# Patient Record
Sex: FEMALE | Race: WHITE | HISPANIC OR LATINO | URBAN - METROPOLITAN AREA
[De-identification: names, ages, dates, MRNs, and addresses within clinical notes are randomized per-mention and may not be internally consistent; named-entity substitution may affect disease eponyms.]

---

## 2021-05-18 ENCOUNTER — TELEHEALTH PROVIDED OTHER THAN IN PATIENT'S HOME (OUTPATIENT)
Dept: URBAN - METROPOLITAN AREA TELEHEALTH 3 | Facility: TELEHEALTH | Age: 51
End: 2021-05-18

## 2021-05-18 VITALS — HEIGHT: 65 IN | WEIGHT: 174 LBS

## 2021-05-18 DIAGNOSIS — R19.5 OTHER FECAL ABNORMALITIES: ICD-10-CM

## 2021-05-18 DIAGNOSIS — Z85.3 PERSONAL HISTORY OF MALIGNANT NEOPLASM OF BREAST: ICD-10-CM

## 2021-05-18 PROCEDURE — 99203 OFFICE O/P NEW LOW 30 MIN: CPT | Mod: 95 | Performed by: PHYSICIAN ASSISTANT

## 2021-05-18 NOTE — SERVICEHPINOTES
PATIENT VERIFIED BY DATE OF BIRTH AND NAME. Patient has been consented for this telecommunication visit.   MICHAEL RUBIN   is a   51   year old    female who is being seen in consultation at the request of   ZULLY MA   for recent positive Cologuard. Patient's son is present to help with translation. Patient has h/o breast cancer, s/p mastectomy in 2016. She had some radiation as well and is on tamoxifen. She reports normal daily BMs, no constipation, diarrhea, or blood in stools. She feels well overall and has no digestive complaints and denies any other significant medical history.

## 2021-05-27 ENCOUNTER — OFFICE (OUTPATIENT)
Dept: URBAN - METROPOLITAN AREA CLINIC 30 | Facility: CLINIC | Age: 51
End: 2021-05-27

## 2021-05-27 VITALS
WEIGHT: 174 LBS | HEART RATE: 82 BPM | RESPIRATION RATE: 17 BRPM | RESPIRATION RATE: 17 BRPM | SYSTOLIC BLOOD PRESSURE: 101 MMHG | DIASTOLIC BLOOD PRESSURE: 73 MMHG | TEMPERATURE: 98.1 F | OXYGEN SATURATION: 100 % | HEART RATE: 76 BPM | SYSTOLIC BLOOD PRESSURE: 95 MMHG | HEART RATE: 71 BPM | SYSTOLIC BLOOD PRESSURE: 106 MMHG | HEART RATE: 79 BPM | DIASTOLIC BLOOD PRESSURE: 57 MMHG | OXYGEN SATURATION: 100 % | SYSTOLIC BLOOD PRESSURE: 129 MMHG | HEART RATE: 82 BPM | OXYGEN SATURATION: 98 % | SYSTOLIC BLOOD PRESSURE: 107 MMHG | HEART RATE: 71 BPM | SYSTOLIC BLOOD PRESSURE: 115 MMHG | DIASTOLIC BLOOD PRESSURE: 55 MMHG | RESPIRATION RATE: 16 BRPM | DIASTOLIC BLOOD PRESSURE: 57 MMHG | HEART RATE: 76 BPM | RESPIRATION RATE: 18 BRPM | OXYGEN SATURATION: 99 % | WEIGHT: 174 LBS | RESPIRATION RATE: 16 BRPM | DIASTOLIC BLOOD PRESSURE: 64 MMHG | SYSTOLIC BLOOD PRESSURE: 111 MMHG | HEART RATE: 73 BPM | SYSTOLIC BLOOD PRESSURE: 106 MMHG | SYSTOLIC BLOOD PRESSURE: 95 MMHG | DIASTOLIC BLOOD PRESSURE: 73 MMHG | RESPIRATION RATE: 20 BRPM | SYSTOLIC BLOOD PRESSURE: 115 MMHG | SYSTOLIC BLOOD PRESSURE: 111 MMHG | DIASTOLIC BLOOD PRESSURE: 66 MMHG | OXYGEN SATURATION: 98 % | HEART RATE: 79 BPM | DIASTOLIC BLOOD PRESSURE: 63 MMHG | RESPIRATION RATE: 20 BRPM | HEIGHT: 65 IN | HEART RATE: 73 BPM | TEMPERATURE: 97.5 F | SYSTOLIC BLOOD PRESSURE: 107 MMHG | DIASTOLIC BLOOD PRESSURE: 55 MMHG | SYSTOLIC BLOOD PRESSURE: 101 MMHG | TEMPERATURE: 97.5 F | HEIGHT: 65 IN | DIASTOLIC BLOOD PRESSURE: 64 MMHG | HEART RATE: 74 BPM | TEMPERATURE: 98.1 F | DIASTOLIC BLOOD PRESSURE: 63 MMHG | RESPIRATION RATE: 18 BRPM | HEART RATE: 74 BPM | SYSTOLIC BLOOD PRESSURE: 129 MMHG | DIASTOLIC BLOOD PRESSURE: 66 MMHG | OXYGEN SATURATION: 99 %

## 2021-05-27 DIAGNOSIS — R19.5 OTHER FECAL ABNORMALITIES: ICD-10-CM

## 2021-05-27 DIAGNOSIS — K63.5 POLYP OF COLON: ICD-10-CM

## 2021-05-27 DIAGNOSIS — D12.5 BENIGN NEOPLASM OF SIGMOID COLON: ICD-10-CM

## 2021-05-27 LAB
GI LOWER POLYPECTOMY EXCISION - SPECM 1: CLINICAL INFORMATION: (no result)
GI LOWER POLYPECTOMY EXCISION - SPECM 1: CLINICAL INFORMATION: (no result)
GI LOWER POLYPECTOMY EXCISION - SPECM 1: CLINICAL OBSERVATIONS: (no result)
GI LOWER POLYPECTOMY EXCISION - SPECM 1: CLINICAL OBSERVATIONS: (no result)
GI LOWER POLYPECTOMY EXCISION - SPECM 1: CPT CODES: (no result)
GI LOWER POLYPECTOMY EXCISION - SPECM 1: CPT CODES: (no result)
GI LOWER POLYPECTOMY EXCISION - SPECM 1: DIAGNOSIS: (no result)
GI LOWER POLYPECTOMY EXCISION - SPECM 1: DIAGNOSIS: (no result)
GI LOWER POLYPECTOMY EXCISION - SPECM 1: GROSS DESCRIPTION: (no result)
GI LOWER POLYPECTOMY EXCISION - SPECM 1: GROSS DESCRIPTION: (no result)
GI LOWER POLYPECTOMY EXCISION - SPECM 1: INCOMING ICD CODE(S): (no result)
GI LOWER POLYPECTOMY EXCISION - SPECM 1: INCOMING ICD CODE(S): (no result)
GI LOWER POLYPECTOMY EXCISION - SPECM 1: MICROSCOPIC DESCRIPTION: (no result)
GI LOWER POLYPECTOMY EXCISION - SPECM 1: MICROSCOPIC DESCRIPTION: (no result)
GI LOWER POLYPECTOMY EXCISION - SPECM 1: PATHOLOGIST: (no result)
GI LOWER POLYPECTOMY EXCISION - SPECM 1: PATHOLOGIST: (no result)
GI LOWER POLYPECTOMY EXCISION - SPECM 1: PDF REPORT: (no result)
GI LOWER POLYPECTOMY EXCISION - SPECM 1: PDF REPORT: (no result)
GI LOWER POLYPECTOMY EXCISION - SPECM 1: REPORT TITLE: (no result)
GI LOWER POLYPECTOMY EXCISION - SPECM 1: REPORT TITLE: (no result)
GI LOWER POLYPECTOMY EXCISION - SPECM 1: SPECIMEN COMMENT: (no result)
GI LOWER POLYPECTOMY EXCISION - SPECM 1: SPECIMEN COMMENT: (no result)
GI LOWER POLYPECTOMY EXCISION - SPECM 1: SPECIMEN SOURCE: (no result)
GI LOWER POLYPECTOMY EXCISION - SPECM 1: SPECIMEN SOURCE: (no result)
GI LOWER POLYPECTOMY EXCISION - SPECM 1: SYNOPSIS: (no result)
GI LOWER POLYPECTOMY EXCISION - SPECM 1: SYNOPSIS: (no result)

## 2021-05-27 PROCEDURE — 00811 ANES LWR INTST NDSC NOS: CPT | Mod: AA,QS

## 2021-07-28 ENCOUNTER — OFFICE (OUTPATIENT)
Dept: URBAN - METROPOLITAN AREA CLINIC 30 | Facility: CLINIC | Age: 51
End: 2021-07-28
Payer: MEDICAID

## 2021-07-28 ENCOUNTER — OFFICE (OUTPATIENT)
Dept: URBAN - METROPOLITAN AREA CLINIC 30 | Facility: CLINIC | Age: 51
End: 2021-07-28

## 2021-07-28 VITALS
WEIGHT: 174 LBS | DIASTOLIC BLOOD PRESSURE: 62 MMHG | RESPIRATION RATE: 12 BRPM | DIASTOLIC BLOOD PRESSURE: 74 MMHG | DIASTOLIC BLOOD PRESSURE: 73 MMHG | HEART RATE: 70 BPM | RESPIRATION RATE: 16 BRPM | HEART RATE: 78 BPM | SYSTOLIC BLOOD PRESSURE: 133 MMHG | SYSTOLIC BLOOD PRESSURE: 109 MMHG | DIASTOLIC BLOOD PRESSURE: 66 MMHG | TEMPERATURE: 97.9 F | SYSTOLIC BLOOD PRESSURE: 142 MMHG | HEART RATE: 57 BPM | TEMPERATURE: 97.9 F | SYSTOLIC BLOOD PRESSURE: 124 MMHG | SYSTOLIC BLOOD PRESSURE: 102 MMHG | OXYGEN SATURATION: 100 % | HEART RATE: 73 BPM | OXYGEN SATURATION: 100 % | DIASTOLIC BLOOD PRESSURE: 62 MMHG | SYSTOLIC BLOOD PRESSURE: 109 MMHG | HEART RATE: 66 BPM | OXYGEN SATURATION: 95 % | RESPIRATION RATE: 17 BRPM | SYSTOLIC BLOOD PRESSURE: 115 MMHG | RESPIRATION RATE: 16 BRPM | WEIGHT: 174 LBS | TEMPERATURE: 97.7 F | HEART RATE: 66 BPM | SYSTOLIC BLOOD PRESSURE: 124 MMHG | SYSTOLIC BLOOD PRESSURE: 123 MMHG | HEART RATE: 71 BPM | HEART RATE: 69 BPM | RESPIRATION RATE: 17 BRPM | DIASTOLIC BLOOD PRESSURE: 59 MMHG | DIASTOLIC BLOOD PRESSURE: 59 MMHG | OXYGEN SATURATION: 95 % | RESPIRATION RATE: 23 BRPM | TEMPERATURE: 97.7 F | RESPIRATION RATE: 12 BRPM | SYSTOLIC BLOOD PRESSURE: 133 MMHG | DIASTOLIC BLOOD PRESSURE: 66 MMHG | HEART RATE: 57 BPM | SYSTOLIC BLOOD PRESSURE: 123 MMHG | HEART RATE: 69 BPM | HEIGHT: 65 IN | SYSTOLIC BLOOD PRESSURE: 142 MMHG | HEART RATE: 71 BPM | DIASTOLIC BLOOD PRESSURE: 61 MMHG | DIASTOLIC BLOOD PRESSURE: 73 MMHG | HEART RATE: 78 BPM | SYSTOLIC BLOOD PRESSURE: 115 MMHG | OXYGEN SATURATION: 97 % | OXYGEN SATURATION: 97 % | DIASTOLIC BLOOD PRESSURE: 61 MMHG | DIASTOLIC BLOOD PRESSURE: 74 MMHG | HEART RATE: 70 BPM | RESPIRATION RATE: 23 BRPM | SYSTOLIC BLOOD PRESSURE: 102 MMHG | HEIGHT: 65 IN | HEART RATE: 73 BPM

## 2021-07-28 DIAGNOSIS — K63.5 POLYP OF COLON: ICD-10-CM

## 2021-07-28 DIAGNOSIS — Z12.11 ENCOUNTER FOR SCREENING FOR MALIGNANT NEOPLASM OF COLON: ICD-10-CM

## 2021-07-28 LAB
GI LOWER POLYPECTOMY EXCISION - SPECM 1: CLINICAL INFORMATION: (no result)
GI LOWER POLYPECTOMY EXCISION - SPECM 1: CLINICAL INFORMATION: (no result)
GI LOWER POLYPECTOMY EXCISION - SPECM 1: CLINICAL OBSERVATIONS: (no result)
GI LOWER POLYPECTOMY EXCISION - SPECM 1: CLINICAL OBSERVATIONS: (no result)
GI LOWER POLYPECTOMY EXCISION - SPECM 1: CPT CODES: (no result)
GI LOWER POLYPECTOMY EXCISION - SPECM 1: CPT CODES: (no result)
GI LOWER POLYPECTOMY EXCISION - SPECM 1: DIAGNOSIS: (no result)
GI LOWER POLYPECTOMY EXCISION - SPECM 1: DIAGNOSIS: (no result)
GI LOWER POLYPECTOMY EXCISION - SPECM 1: GROSS DESCRIPTION: (no result)
GI LOWER POLYPECTOMY EXCISION - SPECM 1: GROSS DESCRIPTION: (no result)
GI LOWER POLYPECTOMY EXCISION - SPECM 1: INCOMING ICD CODE(S): (no result)
GI LOWER POLYPECTOMY EXCISION - SPECM 1: INCOMING ICD CODE(S): (no result)
GI LOWER POLYPECTOMY EXCISION - SPECM 1: MICROSCOPIC DESCRIPTION: (no result)
GI LOWER POLYPECTOMY EXCISION - SPECM 1: MICROSCOPIC DESCRIPTION: (no result)
GI LOWER POLYPECTOMY EXCISION - SPECM 1: PATHOLOGIST: (no result)
GI LOWER POLYPECTOMY EXCISION - SPECM 1: PATHOLOGIST: (no result)
GI LOWER POLYPECTOMY EXCISION - SPECM 1: PDF REPORT: (no result)
GI LOWER POLYPECTOMY EXCISION - SPECM 1: PDF REPORT: (no result)
GI LOWER POLYPECTOMY EXCISION - SPECM 1: REPORT TITLE: (no result)
GI LOWER POLYPECTOMY EXCISION - SPECM 1: REPORT TITLE: (no result)
GI LOWER POLYPECTOMY EXCISION - SPECM 1: SPECIMEN COMMENT: (no result)
GI LOWER POLYPECTOMY EXCISION - SPECM 1: SPECIMEN COMMENT: (no result)
GI LOWER POLYPECTOMY EXCISION - SPECM 1: SPECIMEN SOURCE: (no result)
GI LOWER POLYPECTOMY EXCISION - SPECM 1: SPECIMEN SOURCE: (no result)
GI LOWER POLYPECTOMY EXCISION - SPECM 1: SYNOPSIS: (no result)
GI LOWER POLYPECTOMY EXCISION - SPECM 1: SYNOPSIS: (no result)
Lab: (no result)
Lab: (no result)

## 2021-07-28 PROCEDURE — 00812 ANES LWR INTST SCR COLSC: CPT | Mod: AA,PT,QS

## 2021-07-28 PROCEDURE — 00812 ANES LWR INTST SCR COLSC: CPT | Mod: QS,PT,AA
